# Patient Record
Sex: FEMALE | Race: BLACK OR AFRICAN AMERICAN
[De-identification: names, ages, dates, MRNs, and addresses within clinical notes are randomized per-mention and may not be internally consistent; named-entity substitution may affect disease eponyms.]

---

## 2019-08-30 ENCOUNTER — HOSPITAL ENCOUNTER (EMERGENCY)
Dept: HOSPITAL 72 - EMR | Age: 69
Discharge: HOME | End: 2019-08-30
Payer: COMMERCIAL

## 2019-08-30 VITALS — DIASTOLIC BLOOD PRESSURE: 84 MMHG | SYSTOLIC BLOOD PRESSURE: 174 MMHG

## 2019-08-30 VITALS — BODY MASS INDEX: 43 KG/M2 | HEIGHT: 60 IN | WEIGHT: 219 LBS

## 2019-08-30 DIAGNOSIS — Z88.5: ICD-10-CM

## 2019-08-30 DIAGNOSIS — J45.909: ICD-10-CM

## 2019-08-30 DIAGNOSIS — G56.02: ICD-10-CM

## 2019-08-30 DIAGNOSIS — M19.032: Primary | ICD-10-CM

## 2019-08-30 PROCEDURE — 73110 X-RAY EXAM OF WRIST: CPT

## 2019-08-30 PROCEDURE — 96372 THER/PROPH/DIAG INJ SC/IM: CPT

## 2019-08-30 PROCEDURE — 99283 EMERGENCY DEPT VISIT LOW MDM: CPT

## 2019-08-30 NOTE — NUR
ED Nurse Note:



Toraol 30mg IM given to right gluteus jass. Patient tolerated the procedure 
without difficutly.

## 2019-08-30 NOTE — EMERGENCY ROOM REPORT
History of Present Illness


General


Chief Complaint:  Upper Extremity Injury


Source:  Patient


 (Wilfredo Clark)





Present Illness


HPI


69-year-old female with history of hypertension currently controlled here 

complaining of 1 week of left wrist pain.  Patient denies any fall or injury 

however reports that she often types and also holds her phone to play games.  

Patient rating her pain 7 out of 10 at this time.  Reports that she has taken 

Ultram, Norco, and ibuprofen for pain with minimal relief.  Denies any tingling 

or numbness.  Denies pain radiation.  Denies history of arthritis or carpal 

tunnel syndrome.  Patient reports that she has a Velcro wrist brace however 

does not wear it as it makes it difficult for her to do her work.  Patient only 

wears it at night and with feels improvement.  Denies chest pain, shortness of 

breath, palpitation, abdominal pain, nausea vomiting all other associated 

symptoms.


 (Wilfredo Clark)


Allergies:  


Coded Allergies:  


     CODEINE (Verified  Allergy, Unknown, 8/30/19)





Patient History


Past Medical History:  see triage record


Past Surgical History:  unable to obtain


Pertinent Family History:  none


Pregnant Now:  No


Immunizations:  UTD


Reviewed Nursing Documentation:  PMH: Agreed; PSxH: Agreed (Wilfredo Clark)





Nursing Documentation-PMH


Past Medical History:  No History, Except For


Hx Asthma:  Yes


 (Wilfredo Clark)





Review of Systems


All Other Systems:  negative except mentioned in HPI


 (Wilfredo Clark)





Physical Exam





Vital Signs








  Date Time  Temp Pulse Resp B/P (MAP) Pulse Ox O2 Delivery O2 Flow Rate FiO2


 


8/30/19 14:56 98.4 75 18 174/84 (114) 96 Room Air  








Sp02 EP Interpretation:  reviewed, normal


General Appearance:  no apparent distress, alert, GCS 15, non-toxic


Head:  normocephalic, atraumatic


Eyes:  bilateral eye normal inspection, bilateral eye PERRL


ENT:  hearing grossly normal, normal pharynx, no angioedema, normal voice


Neck:  full range of motion, supple, supple/symm/no masses


Respiratory:  chest non-tender, lungs clear, normal breath sounds, no rhonchi, 

speaking full sentences


Cardiovascular #1:  regular rate, rhythm, no edema, no murmur


Cardiovascular #2:  2+ radial (R), 2+ radial (L)


Gastrointestinal:  normal bowel sounds, non tender, soft, non-distended, no 

guarding, no rebound


Genitourinary:  no CVA tenderness


Musculoskeletal:  back normal, digits/nails normal, gait/station normal, normal 

range of motion, non-tender, other - Phalen sign positive, positive Tinel


Neurologic:  alert, oriented x3, responsive, motor strength/tone normal, 

sensory intact, speech normal


Psychiatric:  judgement/insight normal, memory normal, mood/affect normal, no 

suicidal/homicidal ideation


Skin:  no rash


Lymphatic:  no adenopathy


 (Wilfredo Clark)





Medical Decision Making


PA Attestation


All diagnoses and treatment plans were reviewed and discussed with my 

supervising physician Dr. Sanabria


 (Wilfredo Clakr)


Diagnostic Impression:  


 Primary Impression:  


 Arthritis


 Additional Impression:  


 Carpal tunnel syndrome


ER Course


69-year-old female with history of hypertension currently controlled here 

complaining of 1 week of left wrist pain.  Patient denies any fall or injury 

however reports that she often types and also holds her phone to play games.  

Patient rating her pain 7 out of 10 at this time.  Reports that she has taken 

Ultram, Norco, and ibuprofen for pain with minimal relief.  Denies any tingling 

or numbness.  Denies pain radiation.  Denies history of arthritis or carpal 

tunnel syndrome.  Patient reports that she has a Velcro wrist brace however 

does not wear it as it makes it difficult for her to do her work.  Patient only 

wears it at night and with feels improvement.  Denies chest pain, shortness of 

breath, palpitation, abdominal pain, nausea vomiting all other associated 

symptoms.





Ddx considered but are not limited to : Wrist sprain, wrist strain, wrist 

fracture, carpal tunnel syndrome, arthritis





Vital signs: are WNL, pt. is afebrile





H&PE are most consistent with: Right wrist, carpal tunnel





ORDERS: Wrist  x-ray, ibuprofen, Voltaren gel





ED INTERVENTIONS: Toradol








DISCHARGE: At this time pt. is stable for d/c to home. Will provide printed 

patient care instructions, and any necessary prescriptions. Care plan and 

follow up instructions have been discussed with the patient prior to discharge.

  Wear your Velcro wrist pain take medication as directed follow-up with a 

primary care provider for physical therapy referral and further assessment 

avoid playing on your phone for long hours.


 (Wilfredo Clark)





Other X-Ray Diagnostic Results


Other X-Ray Diagnostic Results :  


   X-Ray ordered:  left wrist


   # of Views/Limited Vs Complete:  3 View


   Indication:  Pain


   EP Interpretation:  Yes


   PA Xray:  Interpretation reviewed, by supervising MD, and agrees with 

findings.


   Interpretation:  no dislocation, no soft tissue swelling, no fractures


   Impression:  No acute disease


   Electronically Signed by:  Wilfredo Porras PA-C


 (Wilfredo Clark)


Other X-Ray Diagnostic Results :  


   Electronically Signed by:  P A documentation of Xray reviewed by me and is 

accurate, Odell Sanabria MD


 (Odell Sanabria MD)





Last Vital Signs








  Date Time  Temp Pulse Resp B/P (MAP) Pulse Ox O2 Delivery O2 Flow Rate FiO2


 


8/30/19 14:56 98.4 75 18 174/84 (114) 96 Room Air  








 (Wilfredo Clark)


Disposition:  HOME, SELF-CARE


Condition:  Stable


Scripts


Diclofenac Sodium (VOLTAREN) 100 Gm Gel..gram.


2 GM TP TID, #100 GM


   Prov: Wilfredo Clark         8/30/19 


Ibuprofen (Ibu) 800 Mg Tablet


800 MG PO TID, #30 TAB


   Prov: Wilfredo Clark         8/30/19


Referrals:  


NON PHYSICIAN (PCP)


Patient Instructions:  Arthritis, Easy-to-Read, Carpal Tunnel Syndrome, Easy-to-

Read





Additional Instructions:  


Take medication as directed follow-up with your primary care provider for 

referral to physical therapy and possible MRI of wrist worsening symptoms 

return to the emergency room











Wilfredo Clark Aug 30, 2019 16:02


Odell Sanabria MD Aug 31, 2019 04:42

## 2019-08-30 NOTE — DIAGNOSTIC IMAGING REPORT
Indication: Left wrist pain 

 

Findings: 3  views of the left wrist were obtained. 

 

No acute fractures, malalignment, erosions or periostitis are identified. Soft

tissues are unremarkable.

 

Impression: No acute findings.

## 2019-08-30 NOTE — NUR
ED Nurse Note:



Patient presents to ER due to left wrist pain x 1 month; Patient states she has 
intermittent tingling in left hand and cannot carry heavy object as before. Cap 
refill < 3 sec. and senation remained intact. Patient usually holds cell phone 
in left hand. Reports no injury. Patient sitting chair without facial grimacing 
or guarding.

## 2019-08-30 NOTE — NUR
ED Nurse Note:



Patient is being discharged from medical care. Patient is awake, alert, 
oriented x 4. D/C instruction and prescriptions given to patient. Patient 
applied her own left wrist splint. All questions were answered.